# Patient Record
Sex: FEMALE | Employment: OTHER | URBAN - METROPOLITAN AREA
[De-identification: names, ages, dates, MRNs, and addresses within clinical notes are randomized per-mention and may not be internally consistent; named-entity substitution may affect disease eponyms.]

---

## 2019-10-09 ENCOUNTER — RECORDS - HEALTHEAST (OUTPATIENT)
Dept: LAB | Facility: CLINIC | Age: 84
End: 2019-10-09

## 2019-10-09 LAB
ANION GAP SERPL CALCULATED.3IONS-SCNC: 9 MMOL/L (ref 5–18)
BUN SERPL-MCNC: 28 MG/DL (ref 8–28)
CALCIUM SERPL-MCNC: 9.4 MG/DL (ref 8.5–10.5)
CHLORIDE BLD-SCNC: 111 MMOL/L (ref 98–107)
CO2 SERPL-SCNC: 20 MMOL/L (ref 22–31)
CREAT SERPL-MCNC: 1.06 MG/DL (ref 0.6–1.1)
ERYTHROCYTE [DISTWIDTH] IN BLOOD BY AUTOMATED COUNT: 13.8 % (ref 11–14.5)
GFR SERPL CREATININE-BSD FRML MDRD: 49 ML/MIN/1.73M2
GLUCOSE BLD-MCNC: 107 MG/DL (ref 70–125)
HCT VFR BLD AUTO: 37.1 % (ref 35–47)
HGB BLD-MCNC: 11.9 G/DL (ref 12–16)
MCH RBC QN AUTO: 30.7 PG (ref 27–34)
MCHC RBC AUTO-ENTMCNC: 32.1 G/DL (ref 32–36)
MCV RBC AUTO: 96 FL (ref 80–100)
PLATELET # BLD AUTO: 205 THOU/UL (ref 140–440)
PMV BLD AUTO: 11.1 FL (ref 8.5–12.5)
POTASSIUM BLD-SCNC: 4.5 MMOL/L (ref 3.5–5)
RBC # BLD AUTO: 3.87 MILL/UL (ref 3.8–5.4)
SODIUM SERPL-SCNC: 140 MMOL/L (ref 136–145)
WBC: 6.2 THOU/UL (ref 4–11)

## 2019-10-10 ENCOUNTER — RECORDS - HEALTHEAST (OUTPATIENT)
Dept: LAB | Facility: CLINIC | Age: 84
End: 2019-10-10

## 2019-10-11 LAB
ANION GAP SERPL CALCULATED.3IONS-SCNC: 9 MMOL/L (ref 5–18)
BUN SERPL-MCNC: 34 MG/DL (ref 8–28)
CALCIUM SERPL-MCNC: 9 MG/DL (ref 8.5–10.5)
CHLORIDE BLD-SCNC: 107 MMOL/L (ref 98–107)
CO2 SERPL-SCNC: 22 MMOL/L (ref 22–31)
CREAT SERPL-MCNC: 1.2 MG/DL (ref 0.6–1.1)
GFR SERPL CREATININE-BSD FRML MDRD: 42 ML/MIN/1.73M2
GLUCOSE BLD-MCNC: 106 MG/DL (ref 70–125)
POTASSIUM BLD-SCNC: 4.6 MMOL/L (ref 3.5–5)
SODIUM SERPL-SCNC: 138 MMOL/L (ref 136–145)

## 2019-10-14 ENCOUNTER — RECORDS - HEALTHEAST (OUTPATIENT)
Dept: LAB | Facility: CLINIC | Age: 84
End: 2019-10-14

## 2019-10-14 LAB
ANION GAP SERPL CALCULATED.3IONS-SCNC: 6 MMOL/L (ref 5–18)
BUN SERPL-MCNC: 39 MG/DL (ref 8–28)
CALCIUM SERPL-MCNC: 9.1 MG/DL (ref 8.5–10.5)
CHLORIDE BLD-SCNC: 109 MMOL/L (ref 98–107)
CO2 SERPL-SCNC: 26 MMOL/L (ref 22–31)
CREAT SERPL-MCNC: 1.23 MG/DL (ref 0.6–1.1)
GFR SERPL CREATININE-BSD FRML MDRD: 41 ML/MIN/1.73M2
GLUCOSE BLD-MCNC: 100 MG/DL (ref 70–125)
POTASSIUM BLD-SCNC: 4.4 MMOL/L (ref 3.5–5)
SODIUM SERPL-SCNC: 141 MMOL/L (ref 136–145)

## 2019-10-25 ENCOUNTER — RECORDS - HEALTHEAST (OUTPATIENT)
Dept: LAB | Facility: CLINIC | Age: 84
End: 2019-10-25

## 2019-10-25 LAB
ANION GAP SERPL CALCULATED.3IONS-SCNC: 7 MMOL/L (ref 5–18)
BNP SERPL-MCNC: 117 PG/ML (ref 0–167)
BUN SERPL-MCNC: 48 MG/DL (ref 8–28)
CALCIUM SERPL-MCNC: 8.8 MG/DL (ref 8.5–10.5)
CHLORIDE BLD-SCNC: 105 MMOL/L (ref 98–107)
CO2 SERPL-SCNC: 26 MMOL/L (ref 22–31)
CREAT SERPL-MCNC: 1.77 MG/DL (ref 0.6–1.1)
ERYTHROCYTE [DISTWIDTH] IN BLOOD BY AUTOMATED COUNT: 13.7 % (ref 11–14.5)
GFR SERPL CREATININE-BSD FRML MDRD: 27 ML/MIN/1.73M2
GLUCOSE BLD-MCNC: 94 MG/DL (ref 70–125)
HCT VFR BLD AUTO: 34.3 % (ref 35–47)
HGB BLD-MCNC: 10.9 G/DL (ref 12–16)
MCH RBC QN AUTO: 30.8 PG (ref 27–34)
MCHC RBC AUTO-ENTMCNC: 31.8 G/DL (ref 32–36)
MCV RBC AUTO: 97 FL (ref 80–100)
PLATELET # BLD AUTO: 141 THOU/UL (ref 140–440)
PMV BLD AUTO: 12.5 FL (ref 8.5–12.5)
POTASSIUM BLD-SCNC: 3.9 MMOL/L (ref 3.5–5)
RBC # BLD AUTO: 3.54 MILL/UL (ref 3.8–5.4)
SODIUM SERPL-SCNC: 138 MMOL/L (ref 136–145)
WBC: 4.8 THOU/UL (ref 4–11)

## 2019-11-03 ENCOUNTER — RECORDS - HEALTHEAST (OUTPATIENT)
Dept: LAB | Facility: CLINIC | Age: 84
End: 2019-11-03

## 2019-11-03 LAB
BNP SERPL-MCNC: 354 PG/ML (ref 0–167)
C DIFF TOX B STL QL: POSITIVE
ERYTHROCYTE [DISTWIDTH] IN BLOOD BY AUTOMATED COUNT: 14 % (ref 11–14.5)
HCT VFR BLD AUTO: 31.5 % (ref 35–47)
HGB BLD-MCNC: 10.1 G/DL (ref 12–16)
MCH RBC QN AUTO: 30.5 PG (ref 27–34)
MCHC RBC AUTO-ENTMCNC: 32.1 G/DL (ref 32–36)
MCV RBC AUTO: 95 FL (ref 80–100)
PLATELET # BLD AUTO: 173 THOU/UL (ref 140–440)
PMV BLD AUTO: 12 FL (ref 8.5–12.5)
RBC # BLD AUTO: 3.31 MILL/UL (ref 3.8–5.4)
RIBOTYPE 027/NAP1/BI: ABNORMAL
WBC: 16.7 THOU/UL (ref 4–11)

## 2019-11-04 LAB — O+P STL MICRO: NORMAL

## 2019-11-06 ENCOUNTER — RECORDS - HEALTHEAST (OUTPATIENT)
Dept: LAB | Facility: CLINIC | Age: 84
End: 2019-11-06

## 2019-11-06 LAB
ANION GAP SERPL CALCULATED.3IONS-SCNC: 9 MMOL/L (ref 5–18)
BUN SERPL-MCNC: 67 MG/DL (ref 8–28)
CALCIUM SERPL-MCNC: 7.7 MG/DL (ref 8.5–10.5)
CHLORIDE BLD-SCNC: 96 MMOL/L (ref 98–107)
CO2 SERPL-SCNC: 18 MMOL/L (ref 22–31)
CREAT SERPL-MCNC: 2.9 MG/DL (ref 0.6–1.1)
ERYTHROCYTE [DISTWIDTH] IN BLOOD BY AUTOMATED COUNT: 13.8 % (ref 11–14.5)
GFR SERPL CREATININE-BSD FRML MDRD: 15 ML/MIN/1.73M2
GLUCOSE BLD-MCNC: 65 MG/DL (ref 70–125)
HCT VFR BLD AUTO: 34.5 % (ref 35–47)
HGB BLD-MCNC: 11.8 G/DL (ref 12–16)
MCH RBC QN AUTO: 31.7 PG (ref 27–34)
MCHC RBC AUTO-ENTMCNC: 34.2 G/DL (ref 32–36)
MCV RBC AUTO: 93 FL (ref 80–100)
PLATELET # BLD AUTO: 205 THOU/UL (ref 140–440)
PMV BLD AUTO: 11.1 FL (ref 8.5–12.5)
POTASSIUM BLD-SCNC: 4.5 MMOL/L (ref 3.5–5)
RBC # BLD AUTO: 3.72 MILL/UL (ref 3.8–5.4)
SODIUM SERPL-SCNC: 123 MMOL/L (ref 136–145)
WBC: 16.3 THOU/UL (ref 4–11)

## 2019-11-16 ENCOUNTER — RECORDS - HEALTHEAST (OUTPATIENT)
Dept: LAB | Facility: CLINIC | Age: 84
End: 2019-11-16

## 2019-11-16 LAB
ANION GAP SERPL CALCULATED.3IONS-SCNC: 7 MMOL/L (ref 5–18)
BUN SERPL-MCNC: 28 MG/DL (ref 8–28)
CALCIUM SERPL-MCNC: 8.5 MG/DL (ref 8.5–10.5)
CHLORIDE BLD-SCNC: 100 MMOL/L (ref 98–107)
CO2 SERPL-SCNC: 22 MMOL/L (ref 22–31)
CREAT SERPL-MCNC: 1.15 MG/DL (ref 0.6–1.1)
GFR SERPL CREATININE-BSD FRML MDRD: 45 ML/MIN/1.73M2
GLUCOSE BLD-MCNC: 104 MG/DL (ref 70–125)
POTASSIUM BLD-SCNC: 5.3 MMOL/L (ref 3.5–5)
SODIUM SERPL-SCNC: 129 MMOL/L (ref 136–145)

## 2019-12-05 ENCOUNTER — RECORDS - HEALTHEAST (OUTPATIENT)
Dept: LAB | Facility: CLINIC | Age: 84
End: 2019-12-05

## 2019-12-05 LAB
25(OH)D3 SERPL-MCNC: 31.7 NG/ML (ref 30–80)
ALBUMIN SERPL-MCNC: 2 G/DL (ref 3.5–5)
ALP SERPL-CCNC: 70 U/L (ref 45–120)
ALT SERPL W P-5'-P-CCNC: <9 U/L (ref 0–45)
ANION GAP SERPL CALCULATED.3IONS-SCNC: 6 MMOL/L (ref 5–18)
AST SERPL W P-5'-P-CCNC: 21 U/L (ref 0–40)
BASOPHILS # BLD AUTO: 0.1 THOU/UL (ref 0–0.2)
BASOPHILS NFR BLD AUTO: 1 % (ref 0–2)
BILIRUB DIRECT SERPL-MCNC: 0.2 MG/DL
BILIRUB SERPL-MCNC: 0.3 MG/DL (ref 0–1)
BUN SERPL-MCNC: 22 MG/DL (ref 8–28)
CALCIUM SERPL-MCNC: 8.3 MG/DL (ref 8.5–10.5)
CHLORIDE BLD-SCNC: 109 MMOL/L (ref 98–107)
CO2 SERPL-SCNC: 23 MMOL/L (ref 22–31)
CREAT SERPL-MCNC: 1.1 MG/DL (ref 0.6–1.1)
EOSINOPHIL # BLD AUTO: 0.2 THOU/UL (ref 0–0.4)
EOSINOPHIL NFR BLD AUTO: 4 % (ref 0–6)
ERYTHROCYTE [DISTWIDTH] IN BLOOD BY AUTOMATED COUNT: 17.9 % (ref 11–14.5)
GFR SERPL CREATININE-BSD FRML MDRD: 47 ML/MIN/1.73M2
GLUCOSE BLD-MCNC: 88 MG/DL (ref 70–125)
HCT VFR BLD AUTO: 29.2 % (ref 35–47)
HGB BLD-MCNC: 9.3 G/DL (ref 12–16)
LYMPHOCYTES # BLD AUTO: 2.9 THOU/UL (ref 0.8–4.4)
LYMPHOCYTES NFR BLD AUTO: 44 % (ref 20–40)
MCH RBC QN AUTO: 30.4 PG (ref 27–34)
MCHC RBC AUTO-ENTMCNC: 31.8 G/DL (ref 32–36)
MCV RBC AUTO: 95 FL (ref 80–100)
MONOCYTES # BLD AUTO: 0.6 THOU/UL (ref 0–0.9)
MONOCYTES NFR BLD AUTO: 9 % (ref 2–10)
NEUTROPHILS # BLD AUTO: 2.8 THOU/UL (ref 2–7.7)
NEUTROPHILS NFR BLD AUTO: 42 % (ref 50–70)
PLATELET # BLD AUTO: 337 THOU/UL (ref 140–440)
PMV BLD AUTO: 10.6 FL (ref 8.5–12.5)
POTASSIUM BLD-SCNC: 4.1 MMOL/L (ref 3.5–5)
PROT SERPL-MCNC: 5 G/DL (ref 6–8)
RBC # BLD AUTO: 3.06 MILL/UL (ref 3.8–5.4)
SODIUM SERPL-SCNC: 138 MMOL/L (ref 136–145)
TSH SERPL DL<=0.005 MIU/L-ACNC: 3.98 UIU/ML (ref 0.3–5)
VIT B12 SERPL-MCNC: 1267 PG/ML (ref 213–816)
WBC: 6.6 THOU/UL (ref 4–11)

## 2019-12-06 LAB — GABAPENTIN SERPLBLD-MCNC: 35.2 UG/ML (ref 2–20)

## 2019-12-16 ENCOUNTER — RECORDS - HEALTHEAST (OUTPATIENT)
Dept: LAB | Facility: CLINIC | Age: 84
End: 2019-12-16

## 2019-12-16 LAB
FLUAV AG SPEC QL IA: NORMAL
FLUBV AG SPEC QL IA: NORMAL

## 2019-12-19 ENCOUNTER — RECORDS - HEALTHEAST (OUTPATIENT)
Dept: LAB | Facility: CLINIC | Age: 84
End: 2019-12-19

## 2019-12-19 LAB — HGB BLD-MCNC: 10.9 G/DL (ref 12–16)

## 2019-12-20 ENCOUNTER — RECORDS - HEALTHEAST (OUTPATIENT)
Dept: LAB | Facility: CLINIC | Age: 84
End: 2019-12-20

## 2019-12-20 LAB
ANION GAP SERPL CALCULATED.3IONS-SCNC: 8 MMOL/L (ref 5–18)
BASOPHILS # BLD AUTO: 0 THOU/UL (ref 0–0.2)
BASOPHILS NFR BLD AUTO: 0 % (ref 0–2)
BUN SERPL-MCNC: 18 MG/DL (ref 8–28)
CALCIUM SERPL-MCNC: 8.8 MG/DL (ref 8.5–10.5)
CHLORIDE BLD-SCNC: 109 MMOL/L (ref 98–107)
CO2 SERPL-SCNC: 23 MMOL/L (ref 22–31)
CREAT SERPL-MCNC: 0.89 MG/DL (ref 0.6–1.1)
EOSINOPHIL # BLD AUTO: 0.1 THOU/UL (ref 0–0.4)
EOSINOPHIL NFR BLD AUTO: 1 % (ref 0–6)
ERYTHROCYTE [DISTWIDTH] IN BLOOD BY AUTOMATED COUNT: 17.6 % (ref 11–14.5)
GFR SERPL CREATININE-BSD FRML MDRD: 60 ML/MIN/1.73M2
GLUCOSE BLD-MCNC: 76 MG/DL (ref 70–125)
HCT VFR BLD AUTO: 31.4 % (ref 35–47)
HGB BLD-MCNC: 10.1 G/DL (ref 12–16)
LYMPHOCYTES # BLD AUTO: 2.1 THOU/UL (ref 0.8–4.4)
LYMPHOCYTES NFR BLD AUTO: 31 % (ref 20–40)
MCH RBC QN AUTO: 30.6 PG (ref 27–34)
MCHC RBC AUTO-ENTMCNC: 32.2 G/DL (ref 32–36)
MCV RBC AUTO: 95 FL (ref 80–100)
MONOCYTES # BLD AUTO: 0.6 THOU/UL (ref 0–0.9)
MONOCYTES NFR BLD AUTO: 9 % (ref 2–10)
NEUTROPHILS # BLD AUTO: 4 THOU/UL (ref 2–7.7)
NEUTROPHILS NFR BLD AUTO: 58 % (ref 50–70)
PLATELET # BLD AUTO: 273 THOU/UL (ref 140–440)
PMV BLD AUTO: 10.6 FL (ref 8.5–12.5)
POTASSIUM BLD-SCNC: 3.4 MMOL/L (ref 3.5–5)
RBC # BLD AUTO: 3.3 MILL/UL (ref 3.8–5.4)
SODIUM SERPL-SCNC: 140 MMOL/L (ref 136–145)
WBC: 6.9 THOU/UL (ref 4–11)

## 2019-12-21 LAB — GABAPENTIN SERPLBLD-MCNC: 11.7 UG/ML (ref 2–20)

## 2019-12-24 ENCOUNTER — TRANSCRIBE ORDERS (OUTPATIENT)
Dept: OTHER | Age: 84
End: 2019-12-24

## 2019-12-24 DIAGNOSIS — N39.492 POSTURAL URINARY INCONTINENCE: Primary | ICD-10-CM

## 2019-12-24 DIAGNOSIS — R33.9 URINE RETENTION: ICD-10-CM

## 2019-12-27 ENCOUNTER — RECORDS - HEALTHEAST (OUTPATIENT)
Dept: LAB | Facility: CLINIC | Age: 84
End: 2019-12-27

## 2019-12-27 LAB — POTASSIUM BLD-SCNC: 5.1 MMOL/L (ref 3.5–5)

## 2020-01-06 ENCOUNTER — RECORDS - HEALTHEAST (OUTPATIENT)
Dept: LAB | Facility: CLINIC | Age: 85
End: 2020-01-06

## 2020-01-07 LAB
ANION GAP SERPL CALCULATED.3IONS-SCNC: 9 MMOL/L (ref 5–18)
BUN SERPL-MCNC: 44 MG/DL (ref 8–28)
CALCIUM SERPL-MCNC: 8.7 MG/DL (ref 8.5–10.5)
CHLORIDE BLD-SCNC: 108 MMOL/L (ref 98–107)
CO2 SERPL-SCNC: 17 MMOL/L (ref 22–31)
CREAT SERPL-MCNC: 1.67 MG/DL (ref 0.6–1.1)
ERYTHROCYTE [DISTWIDTH] IN BLOOD BY AUTOMATED COUNT: 18.1 % (ref 11–14.5)
GFR SERPL CREATININE-BSD FRML MDRD: 29 ML/MIN/1.73M2
GLUCOSE BLD-MCNC: 85 MG/DL (ref 70–125)
HCT VFR BLD AUTO: 30.7 % (ref 35–47)
HGB BLD-MCNC: 9.6 G/DL (ref 12–16)
MCH RBC QN AUTO: 30.1 PG (ref 27–34)
MCHC RBC AUTO-ENTMCNC: 31.3 G/DL (ref 32–36)
MCV RBC AUTO: 96 FL (ref 80–100)
PLATELET # BLD AUTO: 438 THOU/UL (ref 140–440)
PMV BLD AUTO: 9.7 FL (ref 8.5–12.5)
POTASSIUM BLD-SCNC: 4.6 MMOL/L (ref 3.5–5)
RBC # BLD AUTO: 3.19 MILL/UL (ref 3.8–5.4)
SODIUM SERPL-SCNC: 134 MMOL/L (ref 136–145)
WBC: 8.9 THOU/UL (ref 4–11)

## 2020-01-08 ENCOUNTER — OFFICE VISIT (OUTPATIENT)
Dept: UROLOGY | Facility: CLINIC | Age: 85
End: 2020-01-08
Attending: OBSTETRICS & GYNECOLOGY
Payer: MEDICARE

## 2020-01-08 DIAGNOSIS — A04.72 C. DIFFICILE COLITIS: ICD-10-CM

## 2020-01-08 DIAGNOSIS — N39.46 MIXED INCONTINENCE URGE AND STRESS: Primary | ICD-10-CM

## 2020-01-08 PROBLEM — I63.50 RIGHT PONTINE STROKE (H): Status: ACTIVE | Noted: 2018-04-10

## 2020-01-08 PROBLEM — R65.21 SEPTIC SHOCK (H): Status: ACTIVE | Noted: 2019-11-19

## 2020-01-08 PROBLEM — E87.1 HYPONATREMIA: Status: ACTIVE | Noted: 2019-11-06

## 2020-01-08 PROBLEM — M16.11 OSTEOARTHRITIS OF RIGHT HIP: Status: ACTIVE | Noted: 2017-04-20

## 2020-01-08 PROBLEM — H40.1121 PRIMARY OPEN ANGLE GLAUCOMA OF LEFT EYE, MILD STAGE: Status: ACTIVE | Noted: 2018-01-31

## 2020-01-08 PROBLEM — E66.01 MORBID EXOGENOUS OBESITY (H): Status: ACTIVE | Noted: 2017-06-07

## 2020-01-08 PROBLEM — K52.9 GASTROENTERITIS: Status: ACTIVE | Noted: 2019-10-01

## 2020-01-08 PROBLEM — I48.0 PAROXYSMAL ATRIAL FIBRILLATION (H): Status: ACTIVE | Noted: 2019-10-24

## 2020-01-08 PROBLEM — K26.5 PERFORATED DUODENAL ULCER (H): Status: ACTIVE | Noted: 2019-11-18

## 2020-01-08 PROBLEM — A41.9 SEPTIC SHOCK (H): Status: ACTIVE | Noted: 2019-11-19

## 2020-01-08 PROBLEM — R11.2 NAUSEA & VOMITING: Status: ACTIVE | Noted: 2019-09-30

## 2020-01-08 PROBLEM — A09 GASTROINTESTINAL INFECTION: Status: ACTIVE | Noted: 2019-10-02

## 2020-01-08 PROBLEM — N17.9 AKI (ACUTE KIDNEY INJURY) (H): Status: ACTIVE | Noted: 2019-11-06

## 2020-01-08 PROBLEM — H40.051 OCULAR HYPERTENSION, RIGHT: Status: ACTIVE | Noted: 2018-01-31
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
## 2020-01-08 PROBLEM — J96.00 ACUTE RESPIRATORY FAILURE (H): Status: ACTIVE | Noted: 2019-11-19

## 2020-01-08 RX ORDER — ATORVASTATIN CALCIUM 20 MG/1
20 TABLET, FILM COATED ORAL DAILY
COMMUNITY
Start: 2019-10-24

## 2020-01-08 RX ORDER — LISINOPRIL 2.5 MG/1
2.5 TABLET ORAL DAILY
COMMUNITY
Start: 2019-11-14

## 2020-01-08 RX ORDER — TIMOLOL MALEATE 5 MG/ML
1 SOLUTION/ DROPS OPHTHALMIC DAILY
COMMUNITY
Start: 2019-03-14

## 2020-01-08 RX ORDER — SENNOSIDES A AND B 8.6 MG/1
17.2 TABLET, FILM COATED ORAL DAILY
COMMUNITY
Start: 2019-11-25

## 2020-01-08 RX ORDER — PANTOPRAZOLE SODIUM 40 MG/1
40 TABLET, DELAYED RELEASE ORAL DAILY
COMMUNITY
Start: 2019-11-25

## 2020-01-08 RX ORDER — CLOPIDOGREL BISULFATE 75 MG/1
75 TABLET ORAL DAILY
COMMUNITY
Start: 2019-06-06

## 2020-01-08 RX ORDER — GABAPENTIN 100 MG/1
100 CAPSULE ORAL 3 TIMES DAILY
COMMUNITY

## 2020-01-08 RX ORDER — GABAPENTIN 300 MG/1
600 CAPSULE ORAL DAILY
COMMUNITY
Start: 2019-11-25

## 2020-01-08 RX ORDER — METOPROLOL TARTRATE 25 MG/1
25 TABLET, FILM COATED ORAL DAILY PRN
COMMUNITY
Start: 2019-11-14

## 2020-01-08 RX ORDER — TERAZOSIN 1 MG/1
1 CAPSULE ORAL DAILY PRN
COMMUNITY
Start: 2019-11-25

## 2020-01-08 RX ORDER — AMLODIPINE BESYLATE 5 MG/1
5 TABLET ORAL DAILY PRN
COMMUNITY
Start: 2019-11-14

## 2020-01-08 SDOH — HEALTH STABILITY: MENTAL HEALTH: HOW OFTEN DO YOU HAVE A DRINK CONTAINING ALCOHOL?: NEVER

## 2020-01-08 ASSESSMENT — PAIN SCALES - GENERAL: PAINLEVEL: NO PAIN (0)

## 2020-01-08 NOTE — LETTER
1/8/2020       RE: Rafaela Wilkinson  [address] [facility]  [address]  [place] [facility] [place] [address]     Dear Colleague,    Thank you for referring your patient, Rafaela Wilkinson, to the [facility] at [facility]. Please see a copy of my visit note below.    January 8, 2020    Referring Provider: Referred Self, MD  No address on file    Primary Care Provider: Geremias Thomas    CC: urinary incontinence    HPI:  Rafaela Wilkinson is a 89 year old female who presents for evaluation of her pelvic floor symptoms.  She has had 3 months of alternating urinary retention and incontinence. History was taken form the patient and her daughter. They feel that the symptoms started in late Sep/early Oct around the time she began to experience gastritis and ultimately a perforated duodenal ulcer. She has been admitted to the hospital on several occasions over this time period and has required occ prolonged catheter drainage due to her urinary retention. She has also been diagnosed with c. Difficile and is experiencing continued diarrhea. She is unable to ambulate and her care givers are unable to transfer her from her wheel chair or bad without assistance and without causing an accident with either her bowels or bladder. She is in diapers chronically.     Prolapse:  Do you feel a vaginal bulge? -                                      Pressure? -   Do you have to place your fingers in the vagina or in the rectum to have a bowel movement? -  Impact to quality of life? -     Stress Incontinence:  Do you leak urine with cough, sneeze, exercise? yes  How often do you leak with cough, sneeze, exercise?  occ  How much do you usually leak? More than drops   Do you wear a pad? yes If so; Depends  Impact to quality of life? moderate    Urge Incontinence:  Do you often get sudden urges to urinate? no  How often do have urges? -  If so, do you leak with these urges? -  How much do you  usually leak? -  Impact to quality of life? -    Voids/day:unsure  Nocturia: unsure  Fluid intake: 3  Caffeine: 0    Urinating:  Difficulty starting urination or strain to void? yes  Weak or intermittent stream? yes  Incomplete emptying or dribbling? yes  Pain or burning with urination? no  Any blood in your urine? no    GI:  Constipation? No, has c. diff  Frequency stools -    Straining for stools -  Stool consistency -     Ever leak stool (Accidental Bowel Leakage)? -      If so, how often?               -      If so, do you leak?                   -      Soiling without sensation? -  History of irritable bowel or Crohn's? -    Sexual/Pain:  Are you currently having sex?. no  Pain with sex?   -   Sexual Partner: -  Do any of these symptoms interfere with sex? -  Impact to quality of life? -    Prior therapy:  Ever done pelvic floor physical therapy? no  Trial of medication? no  Have you ever tried a pessary? no    Medical History:  Do you have?   High Cholesterol? yes     Diabetes? no  High Blood pressure? yes     Recurrent UTIs? no  Sleep Apnea? no  Other medical problems: -    Surgical History:    Hysterectomy? no   Bladder Surgery? no   Other? no     OB/Gyn History:  Pregnancies? 4  Deliveries? 3  Vaginal 3  Section 0  Current birth control? -  Periods? -  When was the first day of your last period? -  Last Pap smear? - Any abnormal? -  Last mammogram? -  Last colonoscopy? -    Medications/Vitamins/Supplements: reviewed    Drug Allergies: sulfa and others    Latex Allergy: no  Iodine Allergy no    Family History: (list relationship and age at diagnosis)  Breast cancer? no   Ovarian cancer? no   Colon cancer? no  Other? no    Social History:  Marital status:   Do you/ have you ever smoke(d)  cigarettes? yes  Drink more than 1 alcoholic beverage a day?  no  Occupation? retired    In the past 3 months have you regularly experienced:  Chest pain w/ walking/exercise? no                   Unusual  headaches? no  Leg pain w/ walking/exercise? no                       Easy bruising? yes  Difficulty breathing w/ walking/exercise? yes  Problems with vision? no  Dizziness, falls, or fainting? yes  Excessive bleeding from cuts, gums, surgery? no  Other: no    Past Medical History:   Diagnosis Date     Acute and chr resp failure, unsp w hypoxia or hypercapnia (H)      LEANDRO (acute kidney injury) (H)      Atrial fibrillation (H)      Breast CA (H)      CAD (coronary artery disease)      Clostridioides difficile diarrhea      Diabetes mellitus (H)      Diverticula of colon      Fall      GERD (gastroesophageal reflux disease)      Glaucoma      Gout      Hypertension      Mixed hyperlipidemia      Morbid obesity (H)      Ocular hyperemia of right eye      Osteoarthritis of right hip      Perforated duodenal bulb ulcer (H)      Septic shock (H)      Shingles      Sicca (H)      Urinary incontinence in female      Ventral hernia        Past Surgical History:   Procedure Laterality Date     AORTIC VALVE REPLACEMENT  06/08/2015     APPENDECTOMY       AS LAMINECTOMY,>2 SGMT,CERVICAL       AS TOTAL HIP ARTHROPLASTY       BREAST BIOPSY, CORE RT/LT       BREAST LUMPECTOMY, RT/LT  11/22/2010    right     C ANESTH,OPEN HEART SURGERY+PUMP       CABG NEVAEH QUAL ACT PERFORM       CARPAL TUNNEL RELEASE RT/LT       cataract removal       eye lid surgery       H EYE PHAKIC IOL/ICL WITH YAG LASER IRIDOTOMY, BILATERAL       LAP VENTRAL HERNIA REPAIR       LAPAROSCOPIC CHOLECYSTECTOMY       kelly bso  1990       Social History     Socioeconomic History     Marital status:      Spouse name: Not on file     Number of children: Not on file     Years of education: Not on file     Highest education level: Not on file   Occupational History     Not on file   Social Needs     Financial resource strain: Not on file     Food insecurity:     Worry: Not on file     Inability: Not on file     Transportation needs:     Medical: Not on file      Non-medical: Not on file   Tobacco Use     Smoking status: Former Smoker     Smokeless tobacco: Never Used   Substance and Sexual Activity     Alcohol use: Not Currently     Frequency: Never     Drug use: Not on file     Sexual activity: Not Currently     Partners: Male   Lifestyle     Physical activity:     Days per week: Not on file     Minutes per session: Not on file     Stress: Not on file   Relationships     Social connections:     Talks on phone: Not on file     Gets together: Not on file     Attends Synagogue service: Not on file     Active member of club or organization: Not on file     Attends meetings of clubs or organizations: Not on file     Relationship status: Not on file     Intimate partner violence:     Fear of current or ex partner: Not on file     Emotionally abused: Not on file     Physically abused: Not on file     Forced sexual activity: Not on file   Other Topics Concern     Not on file   Social History Narrative     Not on file       Family History   Problem Relation Age of Onset     Heart Disease Mother      Diabetes Father      Down Syndrome Sister      Diabetes Brother      Heart Disease Maternal Uncle      Breast Cancer Paternal Aunt        ROS    Allergies   Allergen Reactions     Hydralazine Other (See Comments) and Swelling     Facial flushing. Patient felt like something was stuck on the roof of her mouth. Swollen uvula.  Flushed feeling       Sulfa Drugs Hives, Other (See Comments) and Swelling     .  Per pt --confusion         Current Outpatient Medications   Medication     amLODIPine (NORVASC) 5 MG tablet     atorvastatin (LIPITOR) 20 MG tablet     clopidogrel (PLAVIX) 75 MG tablet     gabapentin (NEURONTIN) 300 MG capsule     lisinopril (PRINIVIL/ZESTRIL) 2.5 MG tablet     metoprolol tartrate (LOPRESSOR) 25 MG tablet     pantoprazole (PROTONIX) 40 MG EC tablet     senna (SENNA-TIME) 8.6 MG tablet     terazosin (HYTRIN) 1 MG capsule     timolol maleate (TIMOPTIC) 0.5 % ophthalmic  solution     calcium-vitamin D (OSCAL) 250-125 MG-UNIT per tablet     gabapentin (NEURONTIN) 100 MG capsule     No current facility-administered medications for this visit.        There were no vitals taken for this visit. No LMP recorded. Patient is postmenopausal. There is no height or weight on file to calculate BMI.  Ms. Wilkinson is alert, comfortable in no acute distress, non-labored breathing.   Her exam was deferred as it is necessary to get her c. Difficile resolved before addressing her urinary incontinence. She would benefit from timed voids and a bedside commode with assistance to get to the commode.    A/P: Rafaela Wilkinson is a 89 year old F with urinary incontinence and c. difficile  F/U emily c. Diff resolved.    A total of 45 minutes were spent with the patient today, > 50% in counseling and coordination of care    Neo Chery MD  Professor, OB/GYN  Urogynecologist    CC  Patient Care Team:  Geremias Thomas MD as PCP - General (Internal Medicine)  Juan Mendoza MD as Referring Physician

## 2020-01-08 NOTE — PROGRESS NOTES
January 8, 2020    Referring Provider: Referred Self, MD  No address on file    Primary Care Provider: Geremias Thomas    CC: urinary incontinence    HPI:  Rafaela Wilkinson is a 89 year old female who presents for evaluation of her pelvic floor symptoms.  She has had 3 months of alternating urinary retention and incontinence. History was taken form the patient and her daughter. They feel that the symptoms started in late Sep/early Oct around the time she began to experience gastritis and ultimately a perforated duodenal ulcer. She has been admitted to the hospital on several occasions over this time period and has required occ prolonged catheter drainage due to her urinary retention. She has also been diagnosed with c. Difficile and is experiencing continued diarrhea. She is unable to ambulate and her care givers are unable to transfer her from her wheel chair or bad without assistance and without causing an accident with either her bowels or bladder. She is in diapers chronically.     Prolapse:  Do you feel a vaginal bulge? -                                      Pressure? -   Do you have to place your fingers in the vagina or in the rectum to have a bowel movement? -  Impact to quality of life? -     Stress Incontinence:  Do you leak urine with cough, sneeze, exercise? yes  How often do you leak with cough, sneeze, exercise?  occ  How much do you usually leak? More than drops   Do you wear a pad? yes If so; Depends  Impact to quality of life? moderate    Urge Incontinence:  Do you often get sudden urges to urinate? no  How often do have urges? -  If so, do you leak with these urges? -  How much do you usually leak? -  Impact to quality of life? -    Voids/day:unsure  Nocturia: unsure  Fluid intake: 3  Caffeine: 0    Urinating:  Difficulty starting urination or strain to void? yes  Weak or intermittent stream? yes  Incomplete emptying or dribbling? yes  Pain or burning with urination? no  Any blood in your  urine? no    GI:  Constipation? No, has c. diff  Frequency stools -    Straining for stools -  Stool consistency -     Ever leak stool (Accidental Bowel Leakage)? -      If so, how often?               -      If so, do you leak?                   -      Soiling without sensation? -  History of irritable bowel or Crohn's? -    Sexual/Pain:  Are you currently having sex?. no  Pain with sex?   -   Sexual Partner: -  Do any of these symptoms interfere with sex? -  Impact to quality of life? -    Prior therapy:  Ever done pelvic floor physical therapy? no  Trial of medication? no  Have you ever tried a pessary? no    Medical History:  Do you have?   High Cholesterol? yes     Diabetes? no  High Blood pressure? yes     Recurrent UTIs? no  Sleep Apnea? no  Other medical problems: -    Surgical History:    Hysterectomy? no   Bladder Surgery? no   Other? no     OB/Gyn History:  Pregnancies? 4  Deliveries? 3  Vaginal 3  Section 0  Current birth control? -  Periods? -  When was the first day of your last period? -  Last Pap smear? - Any abnormal? -  Last mammogram? -  Last colonoscopy? -    Medications/Vitamins/Supplements: reviewed    Drug Allergies: sulfa and others    Latex Allergy: no  Iodine Allergy no    Family History: (list relationship and age at diagnosis)  Breast cancer? no   Ovarian cancer? no   Colon cancer? no  Other? no    Social History:  Marital status:   Do you/ have you ever smoke(d)  cigarettes? yes  Drink more than 1 alcoholic beverage a day?  no  Occupation? retired    In the past 3 months have you regularly experienced:  Chest pain w/ walking/exercise? no                   Unusual headaches? no  Leg pain w/ walking/exercise? no                       Easy bruising? yes  Difficulty breathing w/ walking/exercise? yes  Problems with vision? no  Dizziness, falls, or fainting? yes  Excessive bleeding from cuts, gums, surgery? no  Other: no    Past Medical History:   Diagnosis Date     Acute and  chr resp failure, unsp w hypoxia or hypercapnia (H)      LEANDRO (acute kidney injury) (H)      Atrial fibrillation (H)      Breast CA (H)      CAD (coronary artery disease)      Clostridioides difficile diarrhea      Diabetes mellitus (H)      Diverticula of colon      Fall      GERD (gastroesophageal reflux disease)      Glaucoma      Gout      Hypertension      Mixed hyperlipidemia      Morbid obesity (H)      Ocular hyperemia of right eye      Osteoarthritis of right hip      Perforated duodenal bulb ulcer (H)      Septic shock (H)      Shingles      Sicca (H)      Urinary incontinence in female      Ventral hernia        Past Surgical History:   Procedure Laterality Date     AORTIC VALVE REPLACEMENT  06/08/2015     APPENDECTOMY       AS LAMINECTOMY,>2 SGMT,CERVICAL       AS TOTAL HIP ARTHROPLASTY       BREAST BIOPSY, CORE RT/LT       BREAST LUMPECTOMY, RT/LT  11/22/2010    right     C ANESTH,OPEN HEART SURGERY+PUMP       CABG NEVAEH QUAL ACT PERFORM       CARPAL TUNNEL RELEASE RT/LT       cataract removal       eye lid surgery       H EYE PHAKIC IOL/ICL WITH YAG LASER IRIDOTOMY, BILATERAL       LAP VENTRAL HERNIA REPAIR       LAPAROSCOPIC CHOLECYSTECTOMY       kelly bso  1990       Social History     Socioeconomic History     Marital status:      Spouse name: Not on file     Number of children: Not on file     Years of education: Not on file     Highest education level: Not on file   Occupational History     Not on file   Social Needs     Financial resource strain: Not on file     Food insecurity:     Worry: Not on file     Inability: Not on file     Transportation needs:     Medical: Not on file     Non-medical: Not on file   Tobacco Use     Smoking status: Former Smoker     Smokeless tobacco: Never Used   Substance and Sexual Activity     Alcohol use: Not Currently     Frequency: Never     Drug use: Not on file     Sexual activity: Not Currently     Partners: Male   Lifestyle     Physical activity:     Days  per week: Not on file     Minutes per session: Not on file     Stress: Not on file   Relationships     Social connections:     Talks on phone: Not on file     Gets together: Not on file     Attends Jain service: Not on file     Active member of club or organization: Not on file     Attends meetings of clubs or organizations: Not on file     Relationship status: Not on file     Intimate partner violence:     Fear of current or ex partner: Not on file     Emotionally abused: Not on file     Physically abused: Not on file     Forced sexual activity: Not on file   Other Topics Concern     Not on file   Social History Narrative     Not on file       Family History   Problem Relation Age of Onset     Heart Disease Mother      Diabetes Father      Down Syndrome Sister      Diabetes Brother      Heart Disease Maternal Uncle      Breast Cancer Paternal Aunt        ROS    Allergies   Allergen Reactions     Hydralazine Other (See Comments) and Swelling     Facial flushing. Patient felt like something was stuck on the roof of her mouth. Swollen uvula.  Flushed feeling       Sulfa Drugs Hives, Other (See Comments) and Swelling     .  Per pt --confusion         Current Outpatient Medications   Medication     amLODIPine (NORVASC) 5 MG tablet     atorvastatin (LIPITOR) 20 MG tablet     clopidogrel (PLAVIX) 75 MG tablet     gabapentin (NEURONTIN) 300 MG capsule     lisinopril (PRINIVIL/ZESTRIL) 2.5 MG tablet     metoprolol tartrate (LOPRESSOR) 25 MG tablet     pantoprazole (PROTONIX) 40 MG EC tablet     senna (SENNA-TIME) 8.6 MG tablet     terazosin (HYTRIN) 1 MG capsule     timolol maleate (TIMOPTIC) 0.5 % ophthalmic solution     calcium-vitamin D (OSCAL) 250-125 MG-UNIT per tablet     gabapentin (NEURONTIN) 100 MG capsule     No current facility-administered medications for this visit.        There were no vitals taken for this visit. No LMP recorded. Patient is postmenopausal. There is no height or weight on file to  calculate BMI.  Ms. Wilkinson is alert, comfortable in no acute distress, non-labored breathing.   Her exam was deferred as it is necessary to get her c. Difficile resolved before addressing her urinary incontinence. She would benefit from timed voids and a bedside commode with assistance to get to the commode.    A/P: Rafaela Wilkinson is a 89 year old F with urinary incontinence and c. difficile  F/U emily c. Diff resolved.    A total of 45 minutes were spent with the patient today, > 50% in counseling and coordination of care    Neo Chery MD  Professor, OB/GYN  Urogynecologist  CC  Patient Care Team:  Geremias Thomas MD as PCP - General (Internal Medicine)  Neo Chery MD as MD (OB/Gyn)  Juan Mendoza MD as Referring Physician  SELF, REFERRED

## 2020-01-08 NOTE — PATIENT INSTRUCTIONS
